# Patient Record
Sex: FEMALE | Race: WHITE
[De-identification: names, ages, dates, MRNs, and addresses within clinical notes are randomized per-mention and may not be internally consistent; named-entity substitution may affect disease eponyms.]

---

## 2020-02-11 ENCOUNTER — HOSPITAL ENCOUNTER (EMERGENCY)
Dept: HOSPITAL 41 - JD.ED | Age: 40
Discharge: HOME | End: 2020-02-11
Payer: COMMERCIAL

## 2020-02-11 VITALS — DIASTOLIC BLOOD PRESSURE: 98 MMHG | HEART RATE: 72 BPM | SYSTOLIC BLOOD PRESSURE: 141 MMHG

## 2020-02-11 DIAGNOSIS — Z90.49: ICD-10-CM

## 2020-02-11 DIAGNOSIS — Z88.0: ICD-10-CM

## 2020-02-11 DIAGNOSIS — A08.4: Primary | ICD-10-CM

## 2020-02-11 DIAGNOSIS — Z79.899: ICD-10-CM

## 2020-02-11 DIAGNOSIS — Z88.1: ICD-10-CM

## 2020-02-11 DIAGNOSIS — E03.9: ICD-10-CM

## 2020-02-11 DIAGNOSIS — Z88.5: ICD-10-CM

## 2020-02-11 DIAGNOSIS — Z91.030: ICD-10-CM

## 2020-02-11 PROCEDURE — 96374 THER/PROPH/DIAG INJ IV PUSH: CPT

## 2020-02-11 PROCEDURE — 74018 RADEX ABDOMEN 1 VIEW: CPT

## 2020-02-11 PROCEDURE — 96375 TX/PRO/DX INJ NEW DRUG ADDON: CPT

## 2020-02-11 PROCEDURE — 80053 COMPREHEN METABOLIC PANEL: CPT

## 2020-02-11 PROCEDURE — 83690 ASSAY OF LIPASE: CPT

## 2020-02-11 PROCEDURE — 83735 ASSAY OF MAGNESIUM: CPT

## 2020-02-11 PROCEDURE — 85025 COMPLETE CBC W/AUTO DIFF WBC: CPT

## 2020-02-11 PROCEDURE — 96361 HYDRATE IV INFUSION ADD-ON: CPT

## 2020-02-11 PROCEDURE — 99284 EMERGENCY DEPT VISIT MOD MDM: CPT

## 2020-02-11 PROCEDURE — 86140 C-REACTIVE PROTEIN: CPT

## 2020-02-11 PROCEDURE — 36415 COLL VENOUS BLD VENIPUNCTURE: CPT

## 2020-02-11 NOTE — EDM.PDOC
ED HPI GENERAL MEDICAL PROBLEM





- General


Chief Complaint: Abdominal Pain


Stated Complaint: STOMACH PAIN


Time Seen by Provider: 20 19:11


Source of Information: Reports: Patient


History Limitations: Reports: No Limitations





- History of Present Illness


INITIAL COMMENTS - FREE TEXT/NARRATIVE: 





89-year-old female presents the ED with a 2 day history of intermittent severe 

abdominal cramping pain associated with yellow high-volume diarrhea stool loss. 

Associated nausea with no vomiting. Diarrhea improved today after taking 

Imodium AD earlier this am.  Cramps have returned tonight and her much worse 

than they were. They seem to return about every one half hour. No blood in the 

diarrhea. Has not been able to eat all day. Did not eat much yesterday. Feels 

weak dizzy and lightheaded with standing. Previous abdominal surgery is that of 

a cholecystectomy appendectomy and  3. And is usually normal. Note 

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 

gastroenteritis as well as RSV and influenza.


Onset: Sudden


Onset Date: 02/10/20


Duration: Day(s):, Colic, Getting Worse, Intermittent


Location: Reports: Abdomen


Quality: Reports: Ache, Sharp, Stabbing, Other (On intermittent colicky 

midabdominal pain and left lower quadrant abdominal pain.)


Severity: Moderate (8 out of 10 when the pain is severe.)


Improves with: Reports: Medication


Worsens with: Reports: None


Context: Reports: Sick Contact.  Denies: Activity, Exercise, Lifting, Trauma, 

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


Associated Symptoms: Reports: Fever/Chills, Loss of Appetite, Malaise, Nausea/

Vomiting, Weakness (Diarrhea lightheaded and dizzy with standing), Other.  

Denies: Cough, cough w sputum, Diaphoresis (Some chills with no fever), 

Headaches, Rash (Nausea without vomiting), Seizure, Shortness of Breath, Syncope


Treatments PTA: Reports: Other (see below) (Imodium A-D when necessary)


  ** Abdomen


Pain Score (Numeric/FACES): 10





- Related Data


 Allergies











Allergy/AdvReac Type Severity Reaction Status Date / Time


 


amoxicillin [Amoxicillin] Allergy  Hives Verified 20 18:28


 


clindamycin Allergy  Burning Verified 20 18:28


 


codeine Allergy  Hives Verified 20 18:28


 


Penicillins Allergy  Hives Verified 20 18:28


 


venom-honey bee Allergy  Anaphylactic Verified 20 18:28





[bee venom (honey bee)]   Shock  











Home Meds: 


 Home Meds





Levothyroxine Sodium [Synthroid] 75 mcg PO DAILY 14 [History]


EPINEPHrine [Epipen] 0.3 mg IM ASDIRECTED PRN #1 pen 10/06/14 [Rx]


Dicyclomine [Bentyl] 20 mg PO Q6H PRN #12 tablet 20 [Rx]


Omeprazole Magnesium [Prilosec Otc] 20 mg PO DAILY 20 [History]











Past Medical History


OB/GYN History: Reports: Pregnancy


Endocrine/Metabolic History: Reports: Hypothyroidism





- Infectious Disease History


Infectious Disease History: Reports: Chicken Pox





- Past Surgical History


HEENT Surgical History: Reports: Adenoidectomy, Myringotomy w Tube(s), 

Tonsillectomy


GI Surgical History: Reports: Appendectomy, Cholecystectomy


Female  Surgical History: Reports:  Section (3.), Other (See Below)


Other Female  Surgeries/Procedures: cervical biopsy


Musculoskeletal Surgical History: Reports: Shoulder Surgery





Social & Family History





- Family History


Family Medical History: Noncontributory





- Tobacco Use


Smoking Status *Q: Never Smoker


Second Hand Smoke Exposure: No





- Caffeine Use


Caffeine Use: Reports: Soda





- Recreational Drug Use


Recreational Drug Use: Yes





- Living Situation & Occupation


Living situation: Reports: 


Occupation: Employed





ED ROS GENERAL





- Review of Systems


Review Of Systems: See Below


Constitutional: Reports: Chills, Malaise, Weakness, Fatigue, Decreased Appetite


HEENT: Reports: No Symptoms


Respiratory: Reports: No Symptoms


Cardiovascular: Reports: No Symptoms


Endocrine: Reports: Fatigue


GI/Abdominal: Reports: Abdominal Pain, Diarrhea (See history of present illness)

, Nausea.  Denies: Hematochezia, Vomiting


: Reports: No Symptoms


Musculoskeletal: Reports: Other (Treatment lower extremity cramping pain for 

several weeks predating the current illness)


Skin: Reports: No Symptoms


Neurological: Reports: No Symptoms


Psychiatric: Reports: No Symptoms


Hematologic/Lymphatic: Reports: No Symptoms


Immunologic: Reports: No Symptoms





ED EXAM, GI/ABD





- Physical Exam


Exam: See Below


Exam Limited By: No Limitations


General Appearance: Alert, WD/WN, No Apparent Distress, Other (Vital signs show 

she is afebrile at 37.1. Rate is 89 and sinus. Respiratory is 19 pulse ox 100% 

/95.)


Eyes: Bilateral: Normal Appearance


Throat/Mouth: Other (No scleral icterus or for pallor.)


Head: Atraumatic ( Tongue is mildly dry and coated), Normocephalic


Neck: Normal Inspection, Supple, Non-Tender, Full Range of Motion.  No: 

Lymphadenopathy (L), Lymphadenopathy (R)


Respiratory/Chest: No Respiratory Distress, Lungs Clear, Normal Breath Sounds, 

Chest Non-Tender


Cardiovascular: Normal Peripheral Pulses, Regular Rate, Rhythm, No Edema, No 

Gallop, No Murmur, No Rub


GI/Abdominal Exam: Normal Bowel Sounds, Soft, Tender.  No: Guarding, Rigid, 

Rebound (Ali tender left lower quadrant without rebound or guarding)


Extremities: Normal Inspection, Normal Range of Motion, Non-Tender


Neurological: Alert, Oriented, CN II-XII Intact, Normal Cognition


Psychiatric: Normal Affect, Normal Mood


Skin Exam: Warm, Dry, Intact, Normal Color, No Rash





Course





- Vital Signs


Last Recorded V/S: 


 Last Vital Signs











Temp  37.1 C   20 18:26


 


Pulse  89   20 18:26


 


Resp  19   20 18:26


 


BP  146/95 H  20 18:26


 


Pulse Ox  100   20 18:26








 





Orthostatic Blood Pressure [     132/101


Standing]                        


Orthostatic Blood Pressure [     132/88


Sitting]                         


Orthostatic Blood Pressure [     108/67


Supine]                          











- Orders/Labs/Meds


Orders: 


 Active Orders 24 hr











 Category Date Time Status


 


 Orthostatic Vital Signs [RC] ASDIRECTED Care  20 19:20 Active


 


 Abdomen 1V Flat [CR] Stat Exams  20 19:18 Taken


 


 Dextrose 5%-0.9% NaCl [Dextrose 5%-Normal Saline] 1,000 Med  20 19:30 

Active





 ml   





 IV ASDIRECTED   


 


 Ketorolac [Toradol] Med  20 19:30 Active





 30 mg IVPUSH ONETIME   








 Medication Orders





Dextrose/Sodium Chloride (Dextrose 5%-Normal Saline)  1,000 mls @ 999 mls/hr IV 

ASDIRECTED BRIANNE


   Last Admin: 20 20:11  Dose: 500 mls/hr


Ketorolac Tromethamine (Toradol)  30 mg IVPUSH ONETIME BRIANNE


   Last Admin: 20 20:10  Dose: 30 mg








Labs: 


 Laboratory Tests











  20 Range/Units





  20:04 20:04 20:04 


 


WBC  8.82    (3.98-10.04)  K/mm3


 


RBC  4.87    (3.98-5.22)  M/mm3


 


Hgb  14.6    (11.2-15.7)  gm/dl


 


Hct  44.8    (34.1-44.9)  %


 


MCV  92.0    (79.4-94.8)  fl


 


MCH  30.0    (25.6-32.2)  pg


 


MCHC  32.6    (32.2-35.5)  g/dl


 


RDW Std Deviation  46.1    (36.4-46.3)  fL


 


Plt Count  269    (182-369)  K/mm3


 


MPV  10.0    (9.4-12.3)  fl


 


Neut % (Auto)  59.5    (34.0-71.1)  %


 


Lymph % (Auto)  29.0    (19.3-51.7)  %


 


Mono % (Auto)  11.0    (4.7-12.5)  %


 


Eos % (Auto)  0.3 L    (0.7-5.8)  


 


Baso % (Auto)  0.1    (0.1-1.2)  %


 


Neut # (Auto)  5.24    (1.56-6.13)  K/mm3


 


Lymph # (Auto)  2.56    (1.18-3.74)  K/mm3


 


Mono # (Auto)  0.97 H    (0.24-0.36)  K/mm3


 


Eos # (Auto)  0.03 L    (0.04-0.36)  K/mm3


 


Baso # (Auto)  0.01    (0.01-0.08)  K/mm3


 


Sodium   141   (136-145)  mEq/L


 


Potassium   3.8   (3.5-5.1)  mEq/L


 


Chloride   105   ()  mEq/L


 


Carbon Dioxide   24   (21-32)  mEq/L


 


Anion Gap   15.8 H   (5-15)  


 


BUN   9   (7-18)  mg/dL


 


Creatinine   0.8   (0.55-1.02)  mg/dL


 


Est Cr Clr Drug Dosing   89.26   mL/min


 


Estimated GFR (MDRD)   > 60   (>60)  mL/min


 


BUN/Creatinine Ratio   11.3 L   (14-18)  


 


Glucose   98   ()  mg/dL


 


Calcium   8.9   (8.5-10.1)  mg/dL


 


Magnesium    1.9  (1.8-2.4)  mg/dl


 


Total Bilirubin   0.4   (0.2-1.0)  mg/dL


 


AST   164 H   (15-37)  U/L


 


ALT   113 H   (14-59)  U/L


 


Alkaline Phosphatase   69   ()  U/L


 


C-Reactive Protein   1.3 H*   (<1.0)  mg/dL


 


Total Protein   7.3   (6.4-8.2)  g/dl


 


Albumin   3.5   (3.4-5.0)  g/dl


 


Globulin   3.8   gm/dL


 


Albumin/Globulin Ratio   0.9 L   (1-2)  


 


Lipase   62 L   ()  U/L











Meds: 


Medications











Generic Name Dose Route Start Last Admin





  Trade Name Fretracy  PRN Reason Stop Dose Admin


 


Dextrose/Sodium Chloride  1,000 mls @ 999 mls/hr  20 19:30  20 20:11





  Dextrose 5%-Normal Saline  IV   500 mls/hr





  ASDIRECTED BRIANNE   Administration





     





     





     





     


 


Ketorolac Tromethamine  30 mg  20 19:30  20 20:10





  Toradol  IVPUSH   30 mg





  ONETIME BIRANNE   Administration





     





     





     





     














Discontinued Medications














Generic Name Dose Route Start Last Admin





  Trade Name Fretracy  PRN Reason Stop Dose Admin


 


Dicyclomine HCl  20 mg  20 21:29  20 21:56





  Bentyl  PO  20 21:30  20 mg





  ONETIME ONE   Administration





     





     





     





     


 


Hydromorphone HCl  1 mg  20 21:28  20 21:56





  Dilaudid  IVPUSH  20 21:29  1 mg





  ONETIME ONE   Administration





     





     





     





     


 


Ondansetron HCl  4 mg  20 19:19  20 20:11





  Zofran  IVPUSH  20 19:20  4 mg





  ONETIME ONE   Administration





     





     





     





     














- Radiology Interpretation


Free Text/Narrative:: 


39-year-old female attends the ER with her . She admits that she's been 

ill for the last 2 days with quite severe diarrhea yesterday i.e. large-volume 

yellow water stool losses which continues today. Associated intermittent severe 

abdominal cramping pain about every 30 minutes. Has not been able to eat much 

or drink much at all for 2 days. Diarrhea improved transiently after taking 

Imodium AD this morning and some Pepto-Bismol tonight. Social mild chills but 

no fever. 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 

with gastroenteritis. Plan orthostatic BP. IV D5 normal saline at open. Zofran 

4 mg IV with Toradol 30 mg IV relief. One of the abdomen to be obtained with 

routine labs.





- Re-Assessments/Exams


Free Text/Narrative Re-Assessment/Exam: 





20 20:49  Labs reveal a normal white count at 8.82. Operative shows 59% 

neutrophils. Hemoglobin is 14.6 with hematocrit of 44.8. He platelet count is 

269,000. Sodium was 141 with potassium of 3.8 chloride 105 with a bicarbonate 

of 24. And a gap is minimally elevated at 15.8. BUNs 9 with a creatinine of 

0.8. EGFR is greater than 60. Glucose is 98. Calcium was 8.9. Magnesium is 

normal at 1.9 Total bilirubin is 0.4 with an AST slightly elevated 164 and ALT 

of 113. Alk phosphatase is normal at 69. C-reactive protein is 1.3. Total 

protein is 7.3 with an albumin fraction of 3.5. Lipase is normal at 62. IV 

fluids are running at 500 mils per hour as the nurses only able to establish a 

22-gauge IV. I will allow her to complete a full liter of IV fluids. Abdominal x

-ray reveals some subtle stool in the right hemicolon but no signs of 

significant constipation. The remainder the colon contains air with no signs of 

bowel obstruction. There is evidence of contrast in the right hemicolon from 

previous contrast study of the abdomen.





20 21:59 Patient has completed a liter of IV fluids. She still 

experiencing intermittent abdominal cramping pain and therefore was given 

Dilaudid 1 mg IV for further pain relief. I'm going to give her Bentyl 20 mg by 

mouth as well. Prescription will be written for the same medication to be taken 

one tablet of 20 mg strength every 6 hours as needed. She is to avoid all dairy 

products and no apple juice or grape juice until stools are formed back up. 

Advise clear fluid diet such as Gatorade/ Powerade advance to light diet as 

tolerated.














Departure





- Departure


Time of Disposition: 22:00


Disposition: Home, Self-Care 01


Condition: Fair


Clinical Impression: 


 Viral gastroenteritis








- Discharge Information


*PRESCRIPTION DRUG MONITORING PROGRAM REVIEWED*: Not Applicable


*COPY OF PRESCRIPTION DRUG MONITORING REPORT IN PATIENT PATRICIO: Not Applicable


Prescriptions: 


Dicyclomine [Bentyl] 20 mg PO Q6H PRN #12 tablet


 PRN Reason: relief of abdominal cramping 


Instructions:  Viral Gastroenteritis, Adult, Easy-to-Read


Referrals: 


Juliana Larkin PA-C [Primary Care Provider] - 


Forms:  ED Department Discharge, ED Return to Work/School Form


Additional Instructions: 


Evaluation in the emergency room  today in regards to recurrent diffuse 

abdominal cramping pain associated with yellow high-volume watery diarrhea 

since yesterday. Imodium right ear taken earlier today seem to help a good deal 

to relieve the pain but often once it wears off the cramping is much worse than 

normal. Emanation of your abdomen did not reveal any signs of a surgical 

abdomen. X-ray of the abdomen also proved to be relatively normal. Signs of 

bowel obstruction related to previous surgeries. Lab work also proved to be 

normal. You were treated with a liter of IV fluids in the emergency room and 

medication called Zofran for nausea relief and initial dose of Toradol 30 mg IV 

for pain relief. However it seemed to wear off within the hour and you 

therefore were given a dose of Dilaudid 1 mg IV to further relieve abdominal 

cramping pain and an oral dose of Bentyl 20 mg by mouth. You may continue 

Bentyl use 20 mg every 6 hours needed for relief of abdominal cramping pain. 

The daily treatment is staying on clear fluids such as Gatorade or Powerade for 

the next 12-24 hours. Suggest 5-6 ounces per hour sipped. When feeling hungry 

try soda crackers first and if tolerated may use Jell-O at any time. May then 

try GM on white bread for hunger relief. May then advance to soup such as 

turkey rice/chicken noodle soup. Avoid all dairy products and no apple juice or 

grape juice until stools are formed back up. Suggest off work for the next 2 

days until you're able to eat a fairly normally and the diarrhea has quit.





Sepsis Event Note





- Evaluation


Sepsis Screening Result: No Definite Risk





- Focused Exam


Vital Signs: 


 Vital Signs











  Temp Pulse Resp BP Pulse Ox


 


 20 18:26  37.1 C  89  19  146/95 H  100











Date Exam was Performed: 20


Time Exam was Performed: 22:20





- My Orders


Last 24 Hours: 


My Active Orders





20 19:18


Abdomen 1V Flat [CR] Stat 





20 19:20


Orthostatic Vital Signs [RC] ASDIRECTED 





20 19:30


Dextrose 5%-0.9% NaCl [Dextrose 5%-Normal Saline] 1,000 ml IV ASDIRECTED 


Ketorolac [Toradol]   30 mg IVPUSH ONETIME 














- Assessment/Plan


Last 24 Hours: 


My Active Orders





20 19:18


Abdomen 1V Flat [CR] Stat 





20 19:20


Orthostatic Vital Signs [RC] ASDIRECTED 





20 19:30


Dextrose 5%-0.9% NaCl [Dextrose 5%-Normal Saline] 1,000 ml IV ASDIRECTED 


Ketorolac [Toradol]   30 mg IVPUSH ONETIME

## 2020-02-12 NOTE — CR
Abdomen: Supine view of the abdomen was obtained.

 

Comparison: Prior abdominal x-ray of 01/06/11.

 

Bowel gas pattern appears normal.  Calcifications are seen within the 

pelvis compatible with phleboliths.  Increased density noted within 

the right abdomen above the pelvis which is felt compatible with bowel

 content.  Surgical clips are seen from prior cholecystectomy.  Bony 

structures are unremarkable for the patient's age.

 

Impression:

1.  Findings as noted above.

2.  Nothing acute is appreciated.

 

Diagnostic code #2

 

Study was dictated in [place] Standard Time

## 2020-05-18 ENCOUNTER — HOSPITAL ENCOUNTER (EMERGENCY)
Dept: HOSPITAL 41 - JD.ED | Age: 40
Discharge: NURSING, REHAB OR LONG-TERM CARE | End: 2020-05-18
Payer: COMMERCIAL

## 2020-05-18 VITALS — DIASTOLIC BLOOD PRESSURE: 82 MMHG | SYSTOLIC BLOOD PRESSURE: 131 MMHG | HEART RATE: 73 BPM

## 2020-05-18 DIAGNOSIS — R42: ICD-10-CM

## 2020-05-18 DIAGNOSIS — H53.8: Primary | ICD-10-CM

## 2020-05-18 DIAGNOSIS — R11.2: ICD-10-CM

## 2020-05-18 PROCEDURE — 84484 ASSAY OF TROPONIN QUANT: CPT

## 2020-05-18 PROCEDURE — 99285 EMERGENCY DEPT VISIT HI MDM: CPT

## 2020-05-18 PROCEDURE — 85610 PROTHROMBIN TIME: CPT

## 2020-05-18 PROCEDURE — 93005 ELECTROCARDIOGRAM TRACING: CPT

## 2020-05-18 PROCEDURE — 70450 CT HEAD/BRAIN W/O DYE: CPT

## 2020-05-18 PROCEDURE — 70548 MR ANGIOGRAPHY NECK W/DYE: CPT

## 2020-05-18 PROCEDURE — 96374 THER/PROPH/DIAG INJ IV PUSH: CPT

## 2020-05-18 PROCEDURE — 96375 TX/PRO/DX INJ NEW DRUG ADDON: CPT

## 2020-05-18 PROCEDURE — 85730 THROMBOPLASTIN TIME PARTIAL: CPT

## 2020-05-18 PROCEDURE — 36415 COLL VENOUS BLD VENIPUNCTURE: CPT

## 2020-05-18 PROCEDURE — 85025 COMPLETE CBC W/AUTO DIFF WBC: CPT

## 2020-05-18 PROCEDURE — 80053 COMPREHEN METABOLIC PANEL: CPT

## 2020-05-18 PROCEDURE — A9577 INJ MULTIHANCE: HCPCS

## 2020-05-18 NOTE — CT
Head CT

 

Technique: Multiple axial sections through the brain were obtained.  

Intravenous contrast was not utilized.

 

Comparison: No prior intracranial imaging is available.

 

Findings: Ventricles along with basal cisterns and sulci over the 

convexities are within normal limits for the patient's age.  No 

abnormal parenchymal densities are seen.  No evidence of intracranial 

hemorrhage.  No midline shift or mass-effect is appreciated.

 

Visualized mastoid sinuses and paranasal sinuses show nothing acute.  

No acute calvarial abnormality is appreciated.

 

Impression:

1.  Nothing acute is appreciated on noncontrast head CT exam.

 

Diagnostic code #1

 

This report was dictated in MDT

## 2020-05-18 NOTE — EDM.PDOCBH
<Lisa Dorman - Last Filed: 20 14:03>





ED HPI GENERAL MEDICAL PROBLEM





- General


Chief Complaint: Neurological Problem


Stated Complaint: [facility]


Time Seen by Provider: 20 10:17





- Related Data


 Allergies











Allergy/AdvReac Type Severity Reaction Status Date / Time


 


amoxicillin [Amoxicillin] Allergy  Hives Verified 20 10:23


 


clindamycin Allergy  Burning Verified 20 10:23


 


codeine Allergy  Hives Verified 20 10:23


 


Penicillins Allergy  Hives Verified 20 10:23


 


venom-honey bee Allergy  Anaphylactic Verified 20 10:23





[bee venom (honey bee)]   Shock  











Home Meds: 


 Home Meds





Levothyroxine Sodium [Synthroid] 75 mcg PO DAILY 14 [History]


EPINEPHrine [Epipen] 0.3 mg IM ASDIRECTED PRN #1 pen 10/06/14 [Rx]


Omeprazole Magnesium [Prilosec Otc] 20 mg PO DAILY 20 [History]











COURSE, BEHAVIORAL HEALTH COMP





- Course


Vital Signs: 


 Last Vital Signs











Temp  97.2 F   20 10:15


 


Pulse  73   20 10:15


 


Resp  18   20 10:15


 


BP  131/82   20 10:15


 


Pulse Ox  97   20 10:15











Orders, Labs, Meds: 


 Active Orders 24 hr











 Category Date Time Status


 


 Cardiac Monitoring [RC] .AS DIRECTED Care  20 10:17 Active


 


 EKG Documentation Completion [RC] STAT Care  20 10:17 Active


 


 Peripheral IV Care [RC] .AS DIRECTED Care  20 10:17 Active


 


 Sodium Chloride 0.9% [Saline Flush] Med  20 10:17 Active





 10 ml FLUSH ASDIRECTED PRN   


 


 Sodium Chloride 0.9% [Saline Flush] Med  20 11:15 Active





 30 ml FLUSH ASDIRECTED   


 


 Peripheral IV Insertion Adult [OM.PC] Stat Oth  20 10:17 Ordered








 Medication Orders





Sodium Chloride (Saline Flush)  10 ml FLUSH ASDIRECTED PRN


   PRN Reason: Keep Vein Open


   Last Admin: 20 10:35  Dose: 10 ml


Sodium Chloride (Saline Flush)  30 ml FLUSH ASDIRECTED BRIANNE


   Last Admin: 20 11:37  Dose: 30 ml





 Laboratory Tests











  20 Range/Units





  10:25 10:25 11:10 


 


WBC  11.35 H    (3.98-10.04)  K/mm3


 


RBC  4.53    (3.98-5.22)  M/mm3


 


Hgb  13.7    (11.2-15.7)  gm/dl


 


Hct  42.3    (34.1-44.9)  %


 


MCV  93.4    (79.4-94.8)  fl


 


MCH  30.2    (25.6-32.2)  pg


 


MCHC  32.4    (32.2-35.5)  g/dl


 


RDW Std Deviation  46.1    (36.4-46.3)  fL


 


Plt Count  255    (182-369)  K/mm3


 


MPV  10.3    (9.4-12.3)  fl


 


Neut % (Auto)  66.0    (34.0-71.1)  %


 


Lymph % (Auto)  27.8    (19.3-51.7)  %


 


Mono % (Auto)  5.2    (4.7-12.5)  %


 


Eos % (Auto)  0.6 L    (0.7-5.8)  


 


Baso % (Auto)  0.2    (0.1-1.2)  %


 


Neut # (Auto)  7.50 H    (1.56-6.13)  K/mm3


 


Lymph # (Auto)  3.15    (1.18-3.74)  K/mm3


 


Mono # (Auto)  0.59 H    (0.24-0.36)  K/mm3


 


Eos # (Auto)  0.07    (0.04-0.36)  K/mm3


 


Baso # (Auto)  0.02    (0.01-0.08)  K/mm3


 


PT    10.7  (9.7-12.0)  SECONDS


 


INR    0.98  


 


APTT    24  (22-31)  SECONDS


 


Sodium   139   (136-145)  mEq/L


 


Potassium   3.6   (3.5-5.1)  mEq/L


 


Chloride   107   ()  mEq/L


 


Carbon Dioxide   21   (21-32)  mEq/L


 


Anion Gap   14.6   (5-15)  


 


BUN   14   (7-18)  mg/dL


 


Creatinine   0.8   (0.55-1.02)  mg/dL


 


Est Cr Clr Drug Dosing   89.26   mL/min


 


Estimated GFR (MDRD)   > 60   (>60)  mL/min


 


BUN/Creatinine Ratio   17.5   (14-18)  


 


Glucose   119 H   ()  mg/dL


 


Calcium   8.6   (8.5-10.1)  mg/dL


 


Total Bilirubin   0.5   (0.2-1.0)  mg/dL


 


AST   12 L   (15-37)  U/L


 


ALT   15   (14-59)  U/L


 


Alkaline Phosphatase   55   ()  U/L


 


Troponin I   < 0.017   (0.00-0.056)  ng/mL


 


Total Protein   7.0   (6.4-8.2)  g/dl


 


Albumin   3.3 L   (3.4-5.0)  g/dl


 


Globulin   3.7   gm/dL


 


Albumin/Globulin Ratio   0.9 L   (1-2)  








Medications











Generic Name Dose Route Start Last Admin





  Trade Name Bel  PRN Reason Stop Dose Admin


 


Sodium Chloride  10 ml  20 10:17  20 10:35





  Saline Flush  FLUSH   10 ml





  ASDIRECTED PRN   Administration





  Keep Vein Open   





     





     





     


 


Sodium Chloride  30 ml  20 11:15  20 11:37





  Saline Flush  FLUSH   30 ml





  ASDIRECTED BRIANNE   Administration





     





     





     





     














Discontinued Medications














Generic Name Dose Route Start Last Admin





  Trade Name Cuhyq  PRN Reason Stop Dose Admin


 


Gadobenate Dimeglumine  20 ml  20 11:13  20 11:37





  Multihance  IVPUSH  20 11:14  20 ml





  ONETIME ONE   Administration





     





     





     





     


 


Ondansetron HCl  4 mg  20 10:57  20 11:01





  Zofran  IVPUSH  20 10:58  4 mg





  ONETIME ONE   Administration





     





     





     





     











Re-Assessment/Re-Exam: 





Informed by the nurses of unequal pupil size and reduced reaction. Examined 

patient's pupils. The right is size 3 mm and the left is 4 mm. The right has 

greater reaction than the left. The patient states she has blurriness in the 

right. She has states this started when the ambulance crew came to pick her up. 

She has never had this before. 





Departure





- Departure


Disposition: DC/Tfer to [facility] 02


Clinical Impression: 


 Dizziness, Blurred vision, Vision changes





Nausea & vomiting


Qualifiers:


 Vomiting type: unspecified Vomiting Intractability: non-intractable Qualified 

Code(s): R11.2 - Nausea with vomiting, unspecified








- Discharge Information


Referrals: 


Juliana Larkin PA-C [Primary Care Provider] - 


Forms:  ED Department Discharge





Sepsis Event Note





- Focused Exam


Vital Signs: 


 Vital Signs











  Temp Pulse Resp BP Pulse Ox


 


 20 10:15  97.2 F  73  18  131/82  97











Date Exam was Performed: 20


Time Exam was Performed: 14:03





- My Orders


Last 24 Hours: 


My Active Orders





20 10:17


Cardiac Monitoring [RC] .AS DIRECTED 


EKG Documentation Completion [RC] STAT 


Peripheral IV Care [RC] .AS DIRECTED 


Sodium Chloride 0.9% [Saline Flush]   10 ml FLUSH ASDIRECTED PRN 


Peripheral IV Insertion Adult [OM.PC] Stat 





20 11:15


Sodium Chloride 0.9% [Saline Flush]   30 ml FLUSH ASDIRECTED 














- Assessment/Plan


Last 24 Hours: 


My Active Orders





20 10:17


Cardiac Monitoring [RC] .AS DIRECTED 


EKG Documentation Completion [RC] STAT 


Peripheral IV Care [RC] .AS DIRECTED 


Sodium Chloride 0.9% [Saline Flush]   10 ml FLUSH ASDIRECTED PRN 


Peripheral IV Insertion Adult [OM.PC] Stat 





20 11:15


Sodium Chloride 0.9% [Saline Flush]   30 ml FLUSH ASDIRECTED 














<Guanaco Bryant - Last Filed: 20 14:38>





ED HPI GENERAL MEDICAL PROBLEM





- General


Source of Information: Reports: Patient, EMS


History Limitations: Reports: No Limitations





- History of Present Illness


INITIAL COMMENTS - FREE TEXT/NARRATIVE: 





The patient presents by [facility] for unresponsive episode, nausea, 

vomiting and headache.  She has been having neck pain for about 4 weeks.  She 

has been seeing her chiropractor for care and today she went for an adjustment.

  She had it done and she felt some pain and relief right away.  She went to 

take care of some things with her children at school and she developed a 

headache and had an unresponsive episode.  She then had generalized weakness.  

They got her to sit up and then she had nausea and vomiting.  She has a 

generalized headache now and generalized weakness.  She has nausea.  She has no 

fever, chills, cough, chest pain, shortness of breath or abdominal pain.  She 

has never had this happen before.  Her  says next month she is going to 

see a surgeon about her neck.


Onset: Sudden


Duration: Minutes:


Location: Reports: Generalized


Improves with: Reports: None


Worsens with: Reports: None


Associated Symptoms: Reports: Headaches, Nausea/Vomiting.  Denies: Chest Pain, 

Cough, Fever/Chills, Shortness of Breath


Other Treatments PTA: 4mg zofran


  ** Bilateral Headache


Pain Score (Numeric/FACES): 8





Past Medical History


OB/GYN History: Reports: Pregnancy


Endocrine/Metabolic History: Reports: Hypothyroidism





- Infectious Disease History


Infectious Disease History: Reports: Chicken Pox





- Past Surgical History


HEENT Surgical History: Reports: Adenoidectomy, Myringotomy w Tube(s), 

Tonsillectomy


GI Surgical History: Reports: Appendectomy, Cholecystectomy


Female  Surgical History: Reports:  Section (3.), Other (See Below)


Other Female  Surgeries/Procedures: cervical biopsy


Musculoskeletal Surgical History: Reports: Shoulder Surgery





Social & Family History





- Family History


Family Medical History: Noncontributory





- Caffeine Use


Caffeine Use: Reports: Soda





- Living Situation & Occupation


Living situation: Reports: 


Occupation: Employed





ED ROS GENERAL





- Review of Systems


Review Of Systems: See Below


Constitutional: Reports: Weakness


HEENT: Reports: No Symptoms


Respiratory: Reports: No Symptoms


Cardiovascular: Reports: No Symptoms


Endocrine: Reports: No Symptoms


GI/Abdominal: Reports: Nausea, Vomiting.  Denies: Abdominal Pain


: Reports: No Symptoms


Musculoskeletal: Reports: No Symptoms


Neurological: Reports: Headache, Weakness (Generalized)





ED EXAM, BEHAVIORAL HEALTH





- Physical Exam


Exam: See Below


Exam Limited By: No Limitations


General Appearance: Alert, No Apparent Distress


Ears: Normal External Exam


Nose: Normal Inspection


Head: Atraumatic, Normocephalic


Neck: Normal Inspection, Tender Lateral


Respiratory/Chest: No Respiratory Distress, Lungs Clear, Normal Breath Sounds


Cardiovascular: Regular Rate, Rhythm, No Edema, No Murmur


GI/Abdominal: Soft, Non-Tender, No Organomegaly, No Mass


Back Exam: Normal Inspection


Extremities: Normal Inspection


Neurological: Alert, No Motor/Sensory Deficits, Oriented x 3





EKG INTERPRETATION


EKG Date: 20


Time: 11:41


Rhythm: NSR


Rate (Beats/Min): 66


Axis: Normal


P-Wave: Present


QRS: Normal


ST-T: Normal


QT: Normal





COURSE, BEHAVIORAL HEALTH COMP





- Course


Re-Assessment/Re-Exam: 





A stroke alert was called.  I went into the room right away and examined the 

patient.  I ordered a CT of her head, EKG, labs and an MRA of her neck.





Her EKG shows a NSR with no acute changes.  Her WBC was elevated at 11.35.  Her 

PT and PTT look good.  Her CMP looks good.  Her troponin is negative.  Her CT 

shows nothing acute.  I did get her in for an MRA of her neck and it shows 

right vertebral artery is diminutive in size with little if no blood supplied 

from the right vertebral artery into the basilar artery.  Uncertain if this 

finding on the right side is chronic or acute, although most likely is 

developmental.





I called [facility] in [place] to talk with neurology.  I talked with Dr Lamas 

and he wanted her sent to the ER.  I talked with Dr Sanchez and he accepted the 

patient.  He wanted me to talk to the neurosurgeon.  I talked to him and he 

wanted me to talk to [facility] in [place].





I waited well over an hour to hear back and I talked with Dr Alvarado the 

neurologist and he did not want any heparin.  He does want further testing done 

that will be done at [facility] in [place].  I called them back and Dr Sanchez was 

okay with me sending her now.





Departure





- Departure


Time of Disposition: 14:40


Condition: Serious





Sepsis Event Note





- Evaluation


Sepsis Screening Result: No Definite Risk





- Focused Exam


Date Exam was Performed: 20


Time Exam was Performed: 14:34

## 2020-05-18 NOTE — MR
MR angiogram of neck

 

Technique: Postcontrast MR angiogram study of the neck was obtained.

 

Findings: Dominant left vertebral artery is seen.  Left vertebral 

artery is patent into the basilar artery.  Right vertebral artery is 

diminutive in size with little if no blood supplied from the right 

vertebral artery into the basilar artery.  Uncertain if this finding 

on the right side is chronic or acute, although most likely is 

developmental.

 

Common carotid arteries as well as external and internal carotid 

arteries appear patent.  No discrete dissection is seen.

 

Impression:

1.  Diminutive size of the right vertebral artery as described above.

2.  MR angiogram study of the neck is otherwise unremarkable.  

 

Diagnostic code #3

 

This report was dictated in MDT

## 2022-10-26 ENCOUNTER — HOSPITAL ENCOUNTER (EMERGENCY)
Dept: HOSPITAL 41 - JD.ED | Age: 42
Discharge: HOME | End: 2022-10-26
Payer: COMMERCIAL

## 2022-10-26 VITALS — HEART RATE: 87 BPM | DIASTOLIC BLOOD PRESSURE: 75 MMHG | SYSTOLIC BLOOD PRESSURE: 132 MMHG

## 2022-10-26 DIAGNOSIS — Z88.5: ICD-10-CM

## 2022-10-26 DIAGNOSIS — Z91.030: ICD-10-CM

## 2022-10-26 DIAGNOSIS — E03.9: ICD-10-CM

## 2022-10-26 DIAGNOSIS — E87.6: Primary | ICD-10-CM

## 2022-10-26 DIAGNOSIS — Z88.0: ICD-10-CM

## 2022-10-26 DIAGNOSIS — Z88.1: ICD-10-CM

## 2022-10-26 DIAGNOSIS — Z79.899: ICD-10-CM

## 2022-10-26 DIAGNOSIS — I10: ICD-10-CM

## 2022-10-26 PROCEDURE — 96366 THER/PROPH/DIAG IV INF ADDON: CPT

## 2022-10-26 PROCEDURE — 96365 THER/PROPH/DIAG IV INF INIT: CPT

## 2022-10-26 PROCEDURE — 99284 EMERGENCY DEPT VISIT MOD MDM: CPT

## 2022-12-26 ENCOUNTER — HOSPITAL ENCOUNTER (EMERGENCY)
Dept: HOSPITAL 41 - JD.ED | Age: 42
LOS: 1 days | Discharge: HOME | End: 2022-12-27
Payer: COMMERCIAL

## 2022-12-26 VITALS — DIASTOLIC BLOOD PRESSURE: 111 MMHG | HEART RATE: 111 BPM | SYSTOLIC BLOOD PRESSURE: 149 MMHG

## 2022-12-26 DIAGNOSIS — I10: ICD-10-CM

## 2022-12-26 DIAGNOSIS — Z20.822: ICD-10-CM

## 2022-12-26 DIAGNOSIS — Z88.0: ICD-10-CM

## 2022-12-26 DIAGNOSIS — J10.1: Primary | ICD-10-CM

## 2022-12-26 DIAGNOSIS — Z88.5: ICD-10-CM

## 2022-12-26 DIAGNOSIS — Z79.899: ICD-10-CM

## 2022-12-26 DIAGNOSIS — Z91.030: ICD-10-CM

## 2022-12-26 DIAGNOSIS — Z88.1: ICD-10-CM

## 2022-12-26 DIAGNOSIS — E03.9: ICD-10-CM

## 2022-12-27 LAB — SARS-COV-2 RNA RESP QL NAA+PROBE: NEGATIVE
